# Patient Record
Sex: FEMALE | Race: WHITE | URBAN - METROPOLITAN AREA
[De-identification: names, ages, dates, MRNs, and addresses within clinical notes are randomized per-mention and may not be internally consistent; named-entity substitution may affect disease eponyms.]

---

## 2021-01-22 ENCOUNTER — OFFICE VISIT (OUTPATIENT)
Dept: URBAN - METROPOLITAN AREA CLINIC 27 | Facility: CLINIC | Age: 76
End: 2021-01-22
Payer: MEDICARE

## 2021-01-22 DIAGNOSIS — H25.13 AGE-RELATED NUCLEAR CATARACT, BILATERAL: ICD-10-CM

## 2021-01-22 DIAGNOSIS — H35.372 PUCKERING OF MACULA, LEFT EYE: Primary | ICD-10-CM

## 2021-01-22 PROCEDURE — 99214 OFFICE O/P EST MOD 30 MIN: CPT | Performed by: OPHTHALMOLOGY

## 2021-01-22 PROCEDURE — 92235 FLUORESCEIN ANGRPH MLTIFRAME: CPT | Performed by: OPHTHALMOLOGY

## 2021-01-22 PROCEDURE — 92134 CPTRZ OPH DX IMG PST SGM RTA: CPT | Performed by: OPHTHALMOLOGY

## 2021-01-22 ASSESSMENT — INTRAOCULAR PRESSURE
OS: 13
OD: 14

## 2021-01-22 NOTE — IMPRESSION/PLAN
Impression: ERM, OS. Status: Symptomatic. Plan: Exam and OCT reveal an ERM OS (276 microns, from 290 microns, from 276 microns). An IVFA from 01/22/2021 demonstrated no telangiectasia. Fundus Photos from 01/22/2021 demonstrated no IRH. Mild. Observe. Charlene, Era Cheung Return in 6 months for Follow-Up Visit, OCT OU, IVFA OS 1st

## 2021-10-01 ENCOUNTER — OFFICE VISIT (OUTPATIENT)
Dept: URBAN - METROPOLITAN AREA CLINIC 27 | Facility: CLINIC | Age: 76
End: 2021-10-01
Payer: MEDICARE

## 2021-10-01 DIAGNOSIS — H04.123 DRY EYE SYNDROME OF BILATERAL LACRIMAL GLANDS: ICD-10-CM

## 2021-10-01 PROCEDURE — 99214 OFFICE O/P EST MOD 30 MIN: CPT | Performed by: OPHTHALMOLOGY

## 2021-10-01 PROCEDURE — 92134 CPTRZ OPH DX IMG PST SGM RTA: CPT | Performed by: OPHTHALMOLOGY

## 2021-10-01 ASSESSMENT — INTRAOCULAR PRESSURE
OS: 17
OD: 16

## 2021-10-01 NOTE — IMPRESSION/PLAN
Impression: ERM, OS. Status: Symptomatic. Plan: Exam and OCT reveal an ERM OS (281 microns, from 276 microns, from 290 microns, from 276 microns). An IVFA from 01/22/2021 demonstrated no telangiectasia. Fundus Photos from 01/22/2021 demonstrated no IRH. Mild. Observe. Charlene, Ferdinand Distance Return in 6 months for Follow-Up Visit, OCT OU, IVFA OS 1st

## 2022-04-06 ENCOUNTER — OFFICE VISIT (OUTPATIENT)
Dept: URBAN - METROPOLITAN AREA CLINIC 27 | Facility: CLINIC | Age: 77
End: 2022-04-06
Payer: MEDICARE

## 2022-04-06 DIAGNOSIS — H43.813 VITREOUS DEGENERATION, BILATERAL: ICD-10-CM

## 2022-04-06 PROCEDURE — 99214 OFFICE O/P EST MOD 30 MIN: CPT | Performed by: OPHTHALMOLOGY

## 2022-04-06 PROCEDURE — 92235 FLUORESCEIN ANGRPH MLTIFRAME: CPT | Performed by: OPHTHALMOLOGY

## 2022-04-06 PROCEDURE — 92134 CPTRZ OPH DX IMG PST SGM RTA: CPT | Performed by: OPHTHALMOLOGY

## 2022-04-06 ASSESSMENT — INTRAOCULAR PRESSURE
OD: 20
OS: 20

## 2022-04-06 NOTE — IMPRESSION/PLAN
Impression: ERM, OS. Status: Symptomatic. Plan: Exam and OCT reveal an ERM OS (277 microns, from 281 microns, from 276 microns, from 290 microns, from 276 microns). An IVFA from 04/06/2022 demonstrated no telangiectasia. Fundus Photos from 04/06/2022 demonstrated no IRH. Mild. Observe. Thanks, Glenroy Mcdonnell Return in 6 months for Follow-Up Visit, OCT OU, IVFA OS 1st

## 2022-10-05 ENCOUNTER — OFFICE VISIT (OUTPATIENT)
Dept: URBAN - METROPOLITAN AREA CLINIC 27 | Facility: CLINIC | Age: 77
End: 2022-10-05
Payer: MEDICARE

## 2022-10-05 DIAGNOSIS — H25.13 AGE-RELATED NUCLEAR CATARACT, BILATERAL: ICD-10-CM

## 2022-10-05 DIAGNOSIS — H35.372 PUCKERING OF MACULA, LEFT EYE: Primary | ICD-10-CM

## 2022-10-05 DIAGNOSIS — H43.813 VITREOUS DEGENERATION, BILATERAL: ICD-10-CM

## 2022-10-05 DIAGNOSIS — H04.123 DRY EYE SYNDROME OF BILATERAL LACRIMAL GLANDS: ICD-10-CM

## 2022-10-05 PROCEDURE — 92235 FLUORESCEIN ANGRPH MLTIFRAME: CPT | Performed by: OPHTHALMOLOGY

## 2022-10-05 PROCEDURE — 92134 CPTRZ OPH DX IMG PST SGM RTA: CPT | Performed by: OPHTHALMOLOGY

## 2022-10-05 PROCEDURE — 99214 OFFICE O/P EST MOD 30 MIN: CPT | Performed by: OPHTHALMOLOGY

## 2022-10-05 ASSESSMENT — INTRAOCULAR PRESSURE
OS: 14
OD: 18

## 2022-10-05 NOTE — IMPRESSION/PLAN
Impression: ERM, OS. Status: Symptomatic. Plan: Exam and OCT reveal an ERM OS (278 microns, from 281 microns, from 276 microns, from 290 microns, from 276 microns). An IVFA from 10/05/2022 demonstrated no telangiectasia. Fundus Photos from 10/05/2022 demonstrated no IRH. Mild. Observe. Thanks, Samy Ventura Return in 6 months for Follow-Up Visit, OCT OU, IVFA OS 1st

## 2022-10-05 NOTE — IMPRESSION/PLAN
Impression: Cataracts, OU. Status: Symptomatic. Plan: Follow. Detail Level: Generalized Detail Level: Simple

## 2023-03-08 ENCOUNTER — OFFICE VISIT (OUTPATIENT)
Dept: URBAN - METROPOLITAN AREA CLINIC 27 | Facility: CLINIC | Age: 78
End: 2023-03-08
Payer: MEDICARE

## 2023-03-08 DIAGNOSIS — H04.123 DRY EYE SYNDROME OF BILATERAL LACRIMAL GLANDS: ICD-10-CM

## 2023-03-08 DIAGNOSIS — H43.813 VITREOUS DEGENERATION, BILATERAL: ICD-10-CM

## 2023-03-08 DIAGNOSIS — H35.372 PUCKERING OF MACULA, LEFT EYE: Primary | ICD-10-CM

## 2023-03-08 DIAGNOSIS — H25.13 AGE-RELATED NUCLEAR CATARACT, BILATERAL: ICD-10-CM

## 2023-03-08 PROCEDURE — 92014 COMPRE OPH EXAM EST PT 1/>: CPT | Performed by: OPHTHALMOLOGY

## 2023-03-08 PROCEDURE — 92134 CPTRZ OPH DX IMG PST SGM RTA: CPT | Performed by: OPHTHALMOLOGY

## 2023-03-08 PROCEDURE — 92235 FLUORESCEIN ANGRPH MLTIFRAME: CPT | Performed by: OPHTHALMOLOGY

## 2023-03-08 ASSESSMENT — INTRAOCULAR PRESSURE
OD: 18
OS: 18

## 2023-03-08 NOTE — IMPRESSION/PLAN
Impression: ERM, OS. Status: Symptomatic. Plan: Exam and OCT reveal an ERM OS (275 microns, from 281 microns, from 276 microns, from 290 microns, from 276 microns). An IVFA from 03/08/2023 demonstrated no telangiectasia. Fundus Photos from 03/08/2023 demonstrated no IRH. Mild. Observe. Thanks, Enma Kolb Return in 24 months for Follow-Up Visit, OCT OU, IVFA OS 1st